# Patient Record
Sex: FEMALE | Race: OTHER | NOT HISPANIC OR LATINO | URBAN - METROPOLITAN AREA
[De-identification: names, ages, dates, MRNs, and addresses within clinical notes are randomized per-mention and may not be internally consistent; named-entity substitution may affect disease eponyms.]

---

## 2019-09-08 ENCOUNTER — EMERGENCY (EMERGENCY)
Facility: HOSPITAL | Age: 24
LOS: 1 days | Discharge: HOME | End: 2019-09-08
Attending: EMERGENCY MEDICINE | Admitting: EMERGENCY MEDICINE
Payer: SELF-PAY

## 2019-09-08 VITALS
HEART RATE: 89 BPM | OXYGEN SATURATION: 100 % | TEMPERATURE: 99 F | RESPIRATION RATE: 15 BRPM | DIASTOLIC BLOOD PRESSURE: 80 MMHG | SYSTOLIC BLOOD PRESSURE: 139 MMHG

## 2019-09-08 PROCEDURE — 10060 I&D ABSCESS SIMPLE/SINGLE: CPT | Mod: RT

## 2019-09-08 PROCEDURE — 99283 EMERGENCY DEPT VISIT LOW MDM: CPT | Mod: 25

## 2019-09-08 PROCEDURE — 76857 US EXAM PELVIC LIMITED: CPT | Mod: 26

## 2019-09-08 RX ORDER — IBUPROFEN 200 MG
600 TABLET ORAL ONCE
Refills: 0 | Status: COMPLETED | OUTPATIENT
Start: 2019-09-08 | End: 2019-09-08

## 2019-09-08 RX ADMIN — Medication 600 MILLIGRAM(S): at 23:35

## 2019-09-08 NOTE — ED PROVIDER NOTE - PHYSICAL EXAMINATION
Skin: [id number] area of erythema to right buttock, centrally self expressing purulent drainage with induration, left buttock with [id number] are of erythema and induration, no fluctuance

## 2019-09-08 NOTE — ED PROVIDER NOTE - PROGRESS NOTE DETAILS
JENNIFER Juarez: Performed I&D, pt tolerated procedure well, will d/c home with doxy and PMD follow up

## 2019-09-08 NOTE — ED PROVIDER NOTE - OBJECTIVE STATEMENT
24yF w/no pmhx presenting with b/l buttock pain and swelling. Pt states 10 days ago she noticed a "pimple" to the right buttock she was able to express pus from, a few days later she noticed pain and redness to the left buttock. Pt states she has been having worsening pain since onset, states she feels feverish when the pain is severe. Pt denies recent travel or illness, 24yF w/no pmhx presenting with b/l buttock pain and swelling. Pt states 10 days ago she noticed a "pimple" to the right buttock she was able to express pus from, a few days later she noticed pain and redness to the left buttock. Pt states she has been having worsening pain since onset, states she feels feverish when the pain is severe. Pt denies recent travel or illness, chills, abd pain, n/v/d, pain in rectum with bowel movements, headache, dizziness, lightheadedness or any other concerns.

## 2019-09-08 NOTE — ED PROVIDER NOTE - PATIENT PORTAL LINK FT
You can access the FollowMyHealth Patient Portal offered by [facility] by registering at the following website: [email or web address]. By joining [employer]’s FollowMyHealth portal, you will also be able to view your health information using other applications (apps) compatible with our system.

## 2019-09-08 NOTE — ED PROVIDER NOTE - NSFOLLOWUPINSTRUCTIONS_ED_ALL_ED_FT
Follow up with your primary care provider within 1 week  Take Doxycycline 100mg (1 tablet) twice daily for 7 days  Apply warm compress to area  Take Motrin 600mg every 8 hours as needed for pain, take with food  Return to the ER with any worsening or concerning symptoms, increased pain or swelling, fever/chills, nausea, vomiting or any other concerns.

## 2019-09-08 NOTE — ED ADULT TRIAGE NOTE - CHIEF COMPLAINT QUOTE
alert no distress c/o "boils" on right and left buttocks started 2 weeks ago   subjective fever and chills earlier today  no med hx

## 2019-09-08 NOTE — ED PROVIDER NOTE - CARE PLAN
Principal Discharge DX:	Abscess of buttock, right  Secondary Diagnosis:	Cellulitis of buttock, right  Secondary Diagnosis:	Cellulitis of buttock, left

## 2019-09-08 NOTE — ED PROVIDER NOTE - CLINICAL SUMMARY MEDICAL DECISION MAKING FREE TEXT BOX
24yF w/no pmhx p/w 10 days of b/l buttock pain, redness and swelling. On exam pt w/ b/l buttock cellulitis w/ right buttock abscess. Will check ucg, plan for I&D and abx coverage with doxy. Bedside US showing drainable fluid collection in right buttock

## 2019-09-08 NOTE — ED PROVIDER NOTE - ATTENDING CONTRIBUTION TO CARE
24F p/w bilat buttock swelling and redness, started 10 days ago, noted lump to R buttock, squeezed it and expressed pus, skin subsequent firm and swollen and tender.  Subj fever.  VS wnl.  R buttock 5cm x 5cm, some discharge there, (+) fluctuance.  L buttock smaller area, small ulcerated area.  No perirectal abscess or swelling.   US confirms subcut abscess.  Rx doxy, I & D, pain meds, wound check in 2 days.  Check UCG before doxy.  VS:  unremarkable    GEN - NAD; well appearing; A+O x3   HEAD - NC/AT     ENT - PEERL, EOMI, mucous membranes  moist , no discharge      NECK: Neck supple, non-tender without lymphadenopathy, no masses, no JVD  PULM - CTA b/l,  symmetric breath sounds  COR -  normal heart sounds    ABD - , ND, NT, soft,  BACK - no CVA tenderness, nontender spine     Bilat buttocks upper medial aspect, R>L redness and swelling.  R side has pointing and draining abscess, with 5x5cm area of redness.  EXTREMS - no edema, no deformity, warm and well perfused    SKIN - no rash or bruising     except as noted.  NEUROLOGIC - alert, CN 2-12 intact, sensation nl, motor no focal deficit.

## 2019-09-09 RX ADMIN — Medication 100 MILLIGRAM(S): at 00:16

## 2019-09-09 NOTE — ED PROCEDURE NOTE - NS_EDPROVIDERDISPOUSERTYPE_ED_A_ED
Attending Attestation (For Attendings USE Only)...
I have personally evaluated and examined the patient. The Attending was available to me as a supervising provider if needed.

## 2019-09-09 NOTE — ED ADULT NURSE NOTE - NSIMPLEMENTINTERV_GEN_ALL_ED
Implemented All Universal Safety Interventions:  [place] to call system. Call bell, personal items and telephone within reach. Instruct patient to call for assistance. Room bathroom lighting operational. Non-slip footwear when patient is off stretcher. Physically safe environment: no spills, clutter or unnecessary equipment. Stretcher in lowest position, wheels locked, appropriate side rails in place.

## 2019-09-09 NOTE — ED PROCEDURE NOTE - PROCEDURE ADDITIONAL DETAILS
89818, ultrasound, musculoskeletal, limited    Focused ED Ultrasound of R upper buttock    Findings:  1.7 x 1 x 1.4cm fluid collection with surrounding hyperechoic change and thickening of the soft tissues.  0.5cm deep.  No deeper extension of abscess    Impression::  abscess and cellulitis of R buttock.     Procedure note and images placed in chart
I&D to right buttock w/ purulent drainage, no packing placed

## 2019-09-09 NOTE — ED ADULT NURSE NOTE - OBJECTIVE STATEMENT
pt is A&Ox3, ambulatory, able to make needs known and presents with C/O what PT describes as "boils" to her bi-lateral buttocks. PT has bi-lateral buttock abscesses that began as a "pimple" to the right buttock that PT was able to express puss from. Now PT reports to bi-lat buttocks and has intermittent, subjective fever symptoms that has prompted a visit to the ED for evaluation of abscess. PT has NAD with breathing that is equal and unlabored